# Patient Record
Sex: FEMALE | Race: WHITE | NOT HISPANIC OR LATINO
[De-identification: names, ages, dates, MRNs, and addresses within clinical notes are randomized per-mention and may not be internally consistent; named-entity substitution may affect disease eponyms.]

---

## 2017-02-07 ENCOUNTER — APPOINTMENT (OUTPATIENT)
Dept: ORTHOPEDIC SURGERY | Facility: CLINIC | Age: 62
End: 2017-02-07

## 2017-02-07 VITALS
BODY MASS INDEX: 28.35 KG/M2 | WEIGHT: 160 LBS | DIASTOLIC BLOOD PRESSURE: 80 MMHG | HEIGHT: 63 IN | SYSTOLIC BLOOD PRESSURE: 120 MMHG

## 2017-02-07 RX ORDER — MULTIVITAMIN
TABLET ORAL
Refills: 0 | Status: ACTIVE | COMMUNITY

## 2017-05-25 DIAGNOSIS — Z78.9 OTHER SPECIFIED HEALTH STATUS: ICD-10-CM

## 2017-06-26 ENCOUNTER — APPOINTMENT (OUTPATIENT)
Dept: ORTHOPEDIC SURGERY | Facility: CLINIC | Age: 62
End: 2017-06-26

## 2017-07-05 ENCOUNTER — APPOINTMENT (OUTPATIENT)
Dept: ORTHOPEDIC SURGERY | Facility: CLINIC | Age: 62
End: 2017-07-05

## 2017-07-10 ENCOUNTER — APPOINTMENT (OUTPATIENT)
Dept: ORTHOPEDIC SURGERY | Facility: CLINIC | Age: 62
End: 2017-07-10

## 2018-01-16 ENCOUNTER — APPOINTMENT (OUTPATIENT)
Dept: ORTHOPEDIC SURGERY | Facility: CLINIC | Age: 63
End: 2018-01-16
Payer: COMMERCIAL

## 2018-01-16 PROCEDURE — 20610 DRAIN/INJ JOINT/BURSA W/O US: CPT | Mod: RT

## 2018-01-23 ENCOUNTER — APPOINTMENT (OUTPATIENT)
Dept: ORTHOPEDIC SURGERY | Facility: CLINIC | Age: 63
End: 2018-01-23
Payer: COMMERCIAL

## 2018-01-23 PROCEDURE — 20610 DRAIN/INJ JOINT/BURSA W/O US: CPT | Mod: LT

## 2018-01-30 ENCOUNTER — APPOINTMENT (OUTPATIENT)
Dept: ORTHOPEDIC SURGERY | Facility: CLINIC | Age: 63
End: 2018-01-30
Payer: COMMERCIAL

## 2018-01-30 PROCEDURE — 20610 DRAIN/INJ JOINT/BURSA W/O US: CPT | Mod: LT

## 2018-08-07 ENCOUNTER — APPOINTMENT (OUTPATIENT)
Dept: ORTHOPEDIC SURGERY | Facility: CLINIC | Age: 63
End: 2018-08-07
Payer: COMMERCIAL

## 2018-08-07 PROCEDURE — 20611 DRAIN/INJ JOINT/BURSA W/US: CPT | Mod: LT

## 2018-08-14 ENCOUNTER — APPOINTMENT (OUTPATIENT)
Dept: ORTHOPEDIC SURGERY | Facility: CLINIC | Age: 63
End: 2018-08-14
Payer: COMMERCIAL

## 2018-08-14 PROCEDURE — 20610 DRAIN/INJ JOINT/BURSA W/O US: CPT | Mod: LT

## 2018-08-21 ENCOUNTER — APPOINTMENT (OUTPATIENT)
Dept: ORTHOPEDIC SURGERY | Facility: CLINIC | Age: 63
End: 2018-08-21
Payer: COMMERCIAL

## 2018-08-21 PROCEDURE — 20610 DRAIN/INJ JOINT/BURSA W/O US: CPT | Mod: LT

## 2018-11-11 ENCOUNTER — FORM ENCOUNTER (OUTPATIENT)
Age: 63
End: 2018-11-11

## 2018-11-12 ENCOUNTER — APPOINTMENT (OUTPATIENT)
Dept: PHYSICAL MEDICINE AND REHAB | Facility: CLINIC | Age: 63
End: 2018-11-12
Payer: COMMERCIAL

## 2018-11-12 ENCOUNTER — APPOINTMENT (OUTPATIENT)
Dept: RADIOLOGY | Facility: CLINIC | Age: 63
End: 2018-11-12

## 2018-11-12 ENCOUNTER — OUTPATIENT (OUTPATIENT)
Dept: OUTPATIENT SERVICES | Facility: HOSPITAL | Age: 63
LOS: 1 days | End: 2018-11-12
Payer: COMMERCIAL

## 2018-11-12 VITALS
DIASTOLIC BLOOD PRESSURE: 94 MMHG | BODY MASS INDEX: 30.14 KG/M2 | HEART RATE: 97 BPM | SYSTOLIC BLOOD PRESSURE: 180 MMHG | RESPIRATION RATE: 16 BRPM | WEIGHT: 168 LBS | HEIGHT: 62.5 IN

## 2018-11-12 PROCEDURE — 73562 X-RAY EXAM OF KNEE 3: CPT | Mod: 26

## 2018-11-12 PROCEDURE — 73562 X-RAY EXAM OF KNEE 3: CPT

## 2018-11-12 PROCEDURE — 99244 OFF/OP CNSLTJ NEW/EST MOD 40: CPT

## 2019-04-25 ENCOUNTER — APPOINTMENT (OUTPATIENT)
Age: 64
End: 2019-04-25
Payer: COMMERCIAL

## 2019-04-25 VITALS
RESPIRATION RATE: 16 BRPM | HEIGHT: 62.5 IN | BODY MASS INDEX: 30.14 KG/M2 | OXYGEN SATURATION: 97 % | WEIGHT: 168 LBS | HEART RATE: 68 BPM

## 2019-04-25 PROCEDURE — 20610 DRAIN/INJ JOINT/BURSA W/O US: CPT | Mod: 50

## 2019-04-25 PROCEDURE — 99214 OFFICE O/P EST MOD 30 MIN: CPT | Mod: 25

## 2019-04-25 NOTE — PHYSICAL EXAM
[FreeTextEntry1] : GEN:  NAD, AAOx3.\par HEENT:  NCAT. EOMI. Anicteric sclerae, no discharge.\par PSYCH:  Normal mood and affect. Responds appropriately to commands.\par CV:  DP and PT pulses 2+ bilaterally, no edema.\par INSPECTION:  No effusion, discoloration. Normal patellar tracking. \par GAIT: Non-antalgic.\par Lumbosacral AROM: within normal limits.\par Hip and ankle PROM: Full and pain free.\par Knee AROM: Full and pain free.\par PALPATION:  No effusion, warmth or crepitus. No tenderness noted at patellar facets, medial or lateral joint line, popliteal fossa, patellar tendon, quad tendon, hamstrings, ITB.  \par LYMPH:  No popliteal LAD or lymphedema.\par REFLEXES:  2+ symmetric bilateral knee and ankles.\par SENSATION:  Normal to light touch in the bilateral lower extremities.\par MOTOR:  5/5 in all key muscle groups of the bilateral lower limbs. No spasticity, tremor, atrophy or contractures noted.\par SPECIAL:  Apley Grind negative bilaterally, Robby negative bilaterally, Medial/Lateral compression negative bilaterally, Varus/Valgus stress negative bilaterally, Single Leg Squat negative bilaterally, Anterior/Posterior drawer negative bilaterally, Lachman negative bilaterally.

## 2019-04-25 NOTE — ASSESSMENT
[FreeTextEntry1] : Impression:\par 1. Bilateral Knee OA\par \par Plan: Review of the history, physical examination, and imaging, the patient's symptoms remain consistent with DJD of bilateral knees, worse on the right. The imaging results and diagnosis were discussed in detail with the patient. We discussed all the potential treatment options including physical therapy, oral medication, ultrasound guided injections, and surgery; as well as alternative therapeutics such as acupuncture and massage. We also discussed the importance of weight loss, ergonomics, and posture in the lost term management of the condition. At this time the patient is more than 6 months from her previous Euflexxa injections and would like to initiate the next round. Please see procedure note for full detail. The patient expressed verbal understanding and is in agreement with the plan of care. All of the patient's questions and concerns were addressed during today's visit.

## 2019-04-25 NOTE — PROCEDURE
[de-identified] : Procedure: Intra-articular BILATERAL  Knee Viscosupplementation Injection (EUFLEXXA) #1\par \par Findings: The RIGHT & LEFT knee XRays reveal tricompartmental degenerative joint disease. \par \par Injectate: 2 mL EUFLEXXA (1% Sodium Hyaluronate)\par \par After risks, benefits and alternatives were discussed and the patient expressed understanding, informed consent was obtained. Risks include but are not limited to bleeding, infection, worsening pain, nerve damage, scar formation. \par \par The RIGHT anterior inferomedial area was marked and prepped using Chloraprep.  Using a 25G 1.5 inch needle, 2mL of 1% Lidocaine was used to anesthetize the superficial tissue.  Then, using an inferomedial approach, a 22-gauge 1.5” needle was introduced into the knee joint and after negative aspiration for blood or synovial fluid, the above injectate was administered needle into the joint space without any resistance. The above procedure was then repeated for the LEFT knee. \par \par At the conclusion of the procedure the needles were withdrawn.  Direct pressure was applied to the area.  A Band-Aid was used to cover the injection site.  There were no complications during or after the procedure.  The patient tolerated the procedure well.  Post procedure care instructions and follow up appointment were given. If there are any complications, the patient was instructed to call the office.  \par \par

## 2019-04-25 NOTE — HISTORY OF PRESENT ILLNESS
[FreeTextEntry1] : Ms. JACKSON LIND is a very pleasant 63 year female who comes in for follow up evaluation of bilateral knee pain. The patient has been undergoing viscosupplementation every six months with consistent symptom relief and is here to initiate the next round of injections. At present the symptoms are relatively unchanged. The pain is located primarily in the anterior aspect of the right knee and is intermittent in nature and described as aching. The pain is rated as 5/10 during today's visit, and ranges from 2-7/10. The patient's symptoms are aggravated by walking and carrying heavy items and alleviated by massage, HA injections, elevation and exercise . The patient denies any night pain, numbness/tingling, weakness, or bowel/bladder dysfunction. The patient has no other complaints at this time.

## 2019-04-29 ENCOUNTER — APPOINTMENT (OUTPATIENT)
Age: 64
End: 2019-04-29
Payer: COMMERCIAL

## 2019-04-29 VITALS — HEIGHT: 62.5 IN | RESPIRATION RATE: 16 BRPM | BODY MASS INDEX: 30.14 KG/M2 | WEIGHT: 168 LBS

## 2019-04-29 PROCEDURE — 20610 DRAIN/INJ JOINT/BURSA W/O US: CPT | Mod: 50

## 2019-05-06 ENCOUNTER — APPOINTMENT (OUTPATIENT)
Age: 64
End: 2019-05-06
Payer: COMMERCIAL

## 2019-05-06 VITALS
BODY MASS INDEX: 30.14 KG/M2 | RESPIRATION RATE: 16 BRPM | OXYGEN SATURATION: 97 % | HEIGHT: 62.5 IN | WEIGHT: 168 LBS | HEART RATE: 71 BPM

## 2019-05-06 PROCEDURE — 20610 DRAIN/INJ JOINT/BURSA W/O US: CPT | Mod: 50

## 2019-09-27 ENCOUNTER — APPOINTMENT (OUTPATIENT)
Dept: PHYSICAL MEDICINE AND REHAB | Facility: CLINIC | Age: 64
End: 2019-09-27
Payer: COMMERCIAL

## 2019-09-27 VITALS
HEART RATE: 80 BPM | HEIGHT: 62.5 IN | BODY MASS INDEX: 30.14 KG/M2 | WEIGHT: 168 LBS | OXYGEN SATURATION: 98 % | RESPIRATION RATE: 16 BRPM

## 2019-09-27 PROCEDURE — 99214 OFFICE O/P EST MOD 30 MIN: CPT

## 2019-11-11 ENCOUNTER — APPOINTMENT (OUTPATIENT)
Dept: PHYSICAL MEDICINE AND REHAB | Facility: CLINIC | Age: 64
End: 2019-11-11
Payer: COMMERCIAL

## 2019-11-11 VITALS
WEIGHT: 168 LBS | BODY MASS INDEX: 30.14 KG/M2 | OXYGEN SATURATION: 97 % | RESPIRATION RATE: 16 BRPM | HEART RATE: 70 BPM | HEIGHT: 62.5 IN

## 2019-11-11 PROCEDURE — 20610 DRAIN/INJ JOINT/BURSA W/O US: CPT | Mod: 50

## 2019-11-18 ENCOUNTER — APPOINTMENT (OUTPATIENT)
Dept: PHYSICAL MEDICINE AND REHAB | Facility: CLINIC | Age: 64
End: 2019-11-18
Payer: COMMERCIAL

## 2019-11-18 VITALS
BODY MASS INDEX: 30.14 KG/M2 | WEIGHT: 168 LBS | HEART RATE: 78 BPM | HEIGHT: 62.5 IN | OXYGEN SATURATION: 100 % | RESPIRATION RATE: 16 BRPM

## 2019-11-18 PROCEDURE — 20610 DRAIN/INJ JOINT/BURSA W/O US: CPT | Mod: 50

## 2019-11-25 ENCOUNTER — APPOINTMENT (OUTPATIENT)
Dept: PHYSICAL MEDICINE AND REHAB | Facility: CLINIC | Age: 64
End: 2019-11-25
Payer: COMMERCIAL

## 2019-11-25 VITALS
HEART RATE: 65 BPM | HEIGHT: 62.5 IN | WEIGHT: 168 LBS | RESPIRATION RATE: 16 BRPM | BODY MASS INDEX: 30.14 KG/M2 | OXYGEN SATURATION: 98 %

## 2019-11-25 PROCEDURE — 20610 DRAIN/INJ JOINT/BURSA W/O US: CPT | Mod: 50

## 2020-06-09 ENCOUNTER — APPOINTMENT (OUTPATIENT)
Dept: PHYSICAL MEDICINE AND REHAB | Facility: CLINIC | Age: 65
End: 2020-06-09
Payer: COMMERCIAL

## 2020-06-09 VITALS — RESPIRATION RATE: 16 BRPM | BODY MASS INDEX: 30.14 KG/M2 | WEIGHT: 168 LBS | HEIGHT: 62.5 IN | OXYGEN SATURATION: 98 %

## 2020-06-09 PROCEDURE — 99214 OFFICE O/P EST MOD 30 MIN: CPT

## 2020-08-05 ENCOUNTER — APPOINTMENT (OUTPATIENT)
Dept: PHYSICAL MEDICINE AND REHAB | Facility: CLINIC | Age: 65
End: 2020-08-05
Payer: COMMERCIAL

## 2020-08-05 VITALS — RESPIRATION RATE: 16 BRPM | WEIGHT: 168 LBS | BODY MASS INDEX: 30.14 KG/M2 | HEIGHT: 62.5 IN

## 2020-08-05 PROCEDURE — 20610 DRAIN/INJ JOINT/BURSA W/O US: CPT | Mod: 50

## 2020-08-11 ENCOUNTER — APPOINTMENT (OUTPATIENT)
Dept: PHYSICAL MEDICINE AND REHAB | Facility: CLINIC | Age: 65
End: 2020-08-11
Payer: COMMERCIAL

## 2020-08-11 VITALS — RESPIRATION RATE: 16 BRPM | BODY MASS INDEX: 30.14 KG/M2 | HEIGHT: 62.5 IN | WEIGHT: 168 LBS

## 2020-08-11 PROCEDURE — 20610 DRAIN/INJ JOINT/BURSA W/O US: CPT | Mod: 50

## 2020-08-17 ENCOUNTER — APPOINTMENT (OUTPATIENT)
Dept: PHYSICAL MEDICINE AND REHAB | Facility: CLINIC | Age: 65
End: 2020-08-17
Payer: COMMERCIAL

## 2020-08-17 PROCEDURE — 20610 DRAIN/INJ JOINT/BURSA W/O US: CPT | Mod: 50

## 2021-02-09 ENCOUNTER — APPOINTMENT (OUTPATIENT)
Dept: PHYSICAL MEDICINE AND REHAB | Facility: CLINIC | Age: 66
End: 2021-02-09
Payer: COMMERCIAL

## 2021-02-09 PROCEDURE — 99442: CPT

## 2021-02-09 RX ORDER — HYALURONATE SODIUM 20 MG/2 ML
20 SYRINGE (ML) INTRAARTICULAR
Qty: 2 | Refills: 0 | Status: ACTIVE | COMMUNITY
Start: 2021-02-09

## 2021-03-08 ENCOUNTER — APPOINTMENT (OUTPATIENT)
Dept: PHYSICAL MEDICINE AND REHAB | Facility: CLINIC | Age: 66
End: 2021-03-08
Payer: COMMERCIAL

## 2021-03-08 VITALS — HEIGHT: 62.5 IN | BODY MASS INDEX: 30.14 KG/M2 | WEIGHT: 168 LBS | RESPIRATION RATE: 16 BRPM | OXYGEN SATURATION: 98 %

## 2021-03-08 PROCEDURE — 99072 ADDL SUPL MATRL&STAF TM PHE: CPT

## 2021-03-08 PROCEDURE — 20610 DRAIN/INJ JOINT/BURSA W/O US: CPT | Mod: 50

## 2021-03-08 PROCEDURE — 99213 OFFICE O/P EST LOW 20 MIN: CPT | Mod: 25

## 2021-03-08 NOTE — HISTORY OF PRESENT ILLNESS
[FreeTextEntry1] : Ms. JACKSON LIND is a very pleasant 63 year female who comes in for follow up evaluation of bilateral knee pain. The patient has had excellent relief with Orthovisc and is ready to begin the next series. The pain remains located primarily in the anterior aspect of the knees and is intermittent in nature and described as aching. The pain is rated as 5/10 during today's visit, and ranges from 2-7/10. The patient's symptoms are aggravated by walking and carrying heavy items and alleviated by massage, HA injections, elevation and exercise. The patient denies any night pain, numbness/tingling, weakness, or bowel/bladder dysfunction. The patient has no other complaints at this time.

## 2021-03-08 NOTE — ASSESSMENT
[FreeTextEntry1] : Impression:\par 1. Bilateral Knee OA\par \par Plan: The history, physical examination, and imaging were reviewed. Symptoms remain consistent with DJD of bilateral knees, worse on the right. The patient has had excellent response to viscosupplementation and is here to begin the first in the next series of Orthovisc injections. Please see procedure note for full detail. The patient expressed verbal understanding and is in agreement with the plan of care. All of the patient's questions and concerns were addressed during today's visit.

## 2021-03-08 NOTE — PROCEDURE
[de-identified] : Procedure: Intra-articular BILATERAL Knee Viscosupplementation Injection (ORTHOVISC) #1\par \par Findings: The RIGHT & LEFT knee XRays reveal tricompartmental degenerative joint disease. \par \par Injectate: 2 mL ORTHOVISC (1% Sodium Hyaluronate)\par \par After risks, benefits and alternatives were discussed and the patient expressed understanding, informed consent was obtained. Risks include but are not limited to bleeding, infection, worsening pain, nerve damage, scar formation. \par \par The RIGHT anterior inferomedial area was marked and prepped using Chloraprep. Using a 25G 1.5 inch needle, 2mL of 1% Lidocaine was used to anesthetize the superficial tissue. Then, using an inferomedial approach, a 22-gauge 1.5” needle was introduced into the knee joint and after negative aspiration for blood or synovial fluid, the above injectate was administered needle into the joint space without any resistance. The above procedure was then repeated for the LEFT knee. \par \par At the conclusion of the procedure the needles were withdrawn. Direct pressure was applied to the area. A Band-Aid was used to cover the injection site. There were no complications during or after the procedure. The patient tolerated the procedure well. Post procedure care instructions and follow up appointment were given. If there are any complications, the patient was instructed to call the office. \par  \par  \par

## 2021-03-15 ENCOUNTER — APPOINTMENT (OUTPATIENT)
Dept: PHYSICAL MEDICINE AND REHAB | Facility: CLINIC | Age: 66
End: 2021-03-15
Payer: COMMERCIAL

## 2021-03-15 VITALS — OXYGEN SATURATION: 98 % | HEIGHT: 62.5 IN | WEIGHT: 168 LBS | BODY MASS INDEX: 30.14 KG/M2 | RESPIRATION RATE: 16 BRPM

## 2021-03-15 PROCEDURE — 20610 DRAIN/INJ JOINT/BURSA W/O US: CPT | Mod: 50

## 2021-03-15 PROCEDURE — 99072 ADDL SUPL MATRL&STAF TM PHE: CPT

## 2021-03-22 ENCOUNTER — APPOINTMENT (OUTPATIENT)
Dept: PHYSICAL MEDICINE AND REHAB | Facility: CLINIC | Age: 66
End: 2021-03-22
Payer: COMMERCIAL

## 2021-03-22 VITALS — WEIGHT: 168 LBS | OXYGEN SATURATION: 98 % | RESPIRATION RATE: 16 BRPM | BODY MASS INDEX: 30.14 KG/M2 | HEIGHT: 62.5 IN

## 2021-03-22 PROCEDURE — 20610 DRAIN/INJ JOINT/BURSA W/O US: CPT | Mod: 50

## 2021-03-22 PROCEDURE — 99072 ADDL SUPL MATRL&STAF TM PHE: CPT

## 2021-10-11 ENCOUNTER — APPOINTMENT (OUTPATIENT)
Dept: PHYSICAL MEDICINE AND REHAB | Facility: CLINIC | Age: 66
End: 2021-10-11
Payer: COMMERCIAL

## 2021-10-11 VITALS — WEIGHT: 160 LBS | BODY MASS INDEX: 28.35 KG/M2 | HEIGHT: 63 IN

## 2021-10-11 PROCEDURE — 99213 OFFICE O/P EST LOW 20 MIN: CPT

## 2021-10-11 NOTE — PHYSICAL EXAM
[FreeTextEntry1] : GEN:  NAD, AAOx3.\par INSPECTION:  No effusion, discoloration. Normal patellar tracking. \par GAIT: Non-antalgic.\par Knee AROM: Full and pain free.\par PALPATION:  No effusion, warmth or crepitus. No tenderness noted at patellar facets, medial or lateral joint line, popliteal fossa, patellar tendon, quad tendon, hamstrings, ITB.  \par MOTOR:  5/5 in all key muscle groups of the bilateral lower limbs. No spasticity, tremor, atrophy or contractures noted.\par SPECIAL:  Apley Grind negative bilaterally, Robby negative bilaterally, Medial/Lateral compression negative bilaterally, Varus/Valgus stress negative bilaterally, Single Leg Squat negative bilaterally, Anterior/Posterior drawer negative bilaterally, Lachman negative bilaterally.

## 2021-10-11 NOTE — ASSESSMENT
[FreeTextEntry1] : Impression:\par 1. Bilateral Knee OA\par \par Plan: Review of the history, physical examination, and imaging, the patient's symptoms remain consistent with DJD of bilateral knees, worse on the right. The imaging results and diagnosis were reviewed with the patient. We discussed all the potential treatment options including physical therapy, oral medication, ultrasound guided injections, and surgery; as well as alternative therapeutics such as acupuncture and massage. We also discussed the importance of weight loss, ergonomics, and posture in the lost term management of the condition. At this time the patient is 6 months from her previous Orthovisc injections and would like to initiate the next round. We will submit for approval and begin the series. The patient expressed verbal understanding and is in agreement with the plan of care. All of the patient's questions and concerns were addressed during today's visit.

## 2021-10-25 ENCOUNTER — APPOINTMENT (OUTPATIENT)
Dept: PHYSICAL MEDICINE AND REHAB | Facility: CLINIC | Age: 66
End: 2021-10-25
Payer: COMMERCIAL

## 2021-10-25 VITALS — BODY MASS INDEX: 28.35 KG/M2 | RESPIRATION RATE: 16 BRPM | HEIGHT: 63 IN | WEIGHT: 160 LBS

## 2021-10-25 PROCEDURE — 20610 DRAIN/INJ JOINT/BURSA W/O US: CPT | Mod: 50

## 2021-11-01 ENCOUNTER — APPOINTMENT (OUTPATIENT)
Dept: PHYSICAL MEDICINE AND REHAB | Facility: CLINIC | Age: 66
End: 2021-11-01
Payer: COMMERCIAL

## 2021-11-01 VITALS — RESPIRATION RATE: 16 BRPM | HEIGHT: 63 IN | BODY MASS INDEX: 28.35 KG/M2 | WEIGHT: 160 LBS

## 2021-11-01 PROCEDURE — 20610 DRAIN/INJ JOINT/BURSA W/O US: CPT | Mod: 50

## 2021-11-08 ENCOUNTER — APPOINTMENT (OUTPATIENT)
Dept: PHYSICAL MEDICINE AND REHAB | Facility: CLINIC | Age: 66
End: 2021-11-08
Payer: COMMERCIAL

## 2021-11-08 VITALS — WEIGHT: 160 LBS | BODY MASS INDEX: 28.35 KG/M2 | RESPIRATION RATE: 16 BRPM | HEIGHT: 63 IN

## 2021-11-08 PROCEDURE — 20610 DRAIN/INJ JOINT/BURSA W/O US: CPT | Mod: 50

## 2022-05-11 ENCOUNTER — APPOINTMENT (OUTPATIENT)
Dept: PHYSICAL MEDICINE AND REHAB | Facility: CLINIC | Age: 67
End: 2022-05-11
Payer: COMMERCIAL

## 2022-05-11 VITALS — WEIGHT: 160 LBS | BODY MASS INDEX: 28.35 KG/M2 | HEIGHT: 63 IN

## 2022-05-11 PROCEDURE — 99213 OFFICE O/P EST LOW 20 MIN: CPT

## 2022-05-11 RX ORDER — HYALURONATE SODIUM 30 MG/2 ML
30 SYRINGE (ML) INTRAARTICULAR
Qty: 6 | Refills: 0 | Status: ACTIVE | COMMUNITY
Start: 2022-05-11

## 2022-05-11 NOTE — HISTORY OF PRESENT ILLNESS
[FreeTextEntry1] : Ms. JACKSON LIND is a very pleasant 63 year female who comes in for follow up evaluation of bilateral knee pain. The patient has had excellent relief over the past several months with Orthovisc. The symptoms have recently started to return and she is interested in repeat injections. The pain remains located primarily in the anterior aspect of the knees and is intermittent in nature and described as aching. The pain is rated as 5/10 during today's visit, and ranges from 2-7/10. The patient's symptoms are aggravated by walking and carrying heavy items and alleviated by massage, HA injections, elevation and exercise. The patient denies any night pain, numbness/tingling, weakness, or bowel/bladder dysfunction. The patient has no other complaints at this time.

## 2022-05-31 ENCOUNTER — APPOINTMENT (OUTPATIENT)
Dept: PHYSICAL MEDICINE AND REHAB | Facility: CLINIC | Age: 67
End: 2022-05-31
Payer: COMMERCIAL

## 2022-05-31 VITALS — BODY MASS INDEX: 28.35 KG/M2 | HEIGHT: 63 IN | WEIGHT: 160 LBS

## 2022-05-31 PROCEDURE — 20610 DRAIN/INJ JOINT/BURSA W/O US: CPT | Mod: RT

## 2022-06-06 ENCOUNTER — APPOINTMENT (OUTPATIENT)
Dept: PHYSICAL MEDICINE AND REHAB | Facility: CLINIC | Age: 67
End: 2022-06-06
Payer: COMMERCIAL

## 2022-06-06 PROCEDURE — 20610 DRAIN/INJ JOINT/BURSA W/O US: CPT | Mod: RT

## 2022-06-13 ENCOUNTER — APPOINTMENT (OUTPATIENT)
Dept: PHYSICAL MEDICINE AND REHAB | Facility: CLINIC | Age: 67
End: 2022-06-13
Payer: COMMERCIAL

## 2022-06-13 VITALS — WEIGHT: 160 LBS | BODY MASS INDEX: 28.35 KG/M2 | HEIGHT: 63 IN

## 2022-06-13 PROCEDURE — 20610 DRAIN/INJ JOINT/BURSA W/O US: CPT | Mod: RT

## 2022-12-07 ENCOUNTER — APPOINTMENT (OUTPATIENT)
Dept: PHYSICAL MEDICINE AND REHAB | Facility: CLINIC | Age: 67
End: 2022-12-07

## 2022-12-07 VITALS — BODY MASS INDEX: 28.35 KG/M2 | WEIGHT: 160 LBS | HEIGHT: 63 IN

## 2022-12-07 PROCEDURE — 99214 OFFICE O/P EST MOD 30 MIN: CPT

## 2022-12-07 RX ORDER — HYALURONATE SODIUM 20 MG/2 ML
20 SYRINGE (ML) INTRAARTICULAR
Qty: 2 | Refills: 0 | Status: ACTIVE | COMMUNITY
Start: 2022-12-07

## 2022-12-07 NOTE — ASSESSMENT
[FreeTextEntry1] : Impression:\par 1. Bilateral Knee OA\par \par Plan: Review of the history, physical examination, and imaging, the patient's symptoms remain consistent with DJD of bilateral knees, worse on the right. The imaging results and diagnosis were reviewed with the patient. We discussed all the potential treatment options including physical therapy, oral medication, ultrasound guided injections, and surgery; as well as alternative therapeutics such as acupuncture and massage. We also discussed the importance of weight loss, ergonomics, and posture in the lost term management of the condition. At this time the patient is 6 months from her previous Viscosupplementation injections and would like to initiate the next round. We will submit for approval and begin the series. The patient expressed verbal understanding and is in agreement with the plan of care. All of the patient's questions and concerns were addressed during today's visit.

## 2022-12-12 ENCOUNTER — APPOINTMENT (OUTPATIENT)
Dept: PHYSICAL MEDICINE AND REHAB | Facility: CLINIC | Age: 67
End: 2022-12-12

## 2022-12-12 VITALS — BODY MASS INDEX: 28.35 KG/M2 | WEIGHT: 160 LBS | HEIGHT: 63 IN

## 2022-12-12 PROCEDURE — 20610 DRAIN/INJ JOINT/BURSA W/O US: CPT | Mod: 50

## 2022-12-12 NOTE — PROCEDURE
[de-identified] : LOT#: [id number]\par EXP DATE: 2023-10-28\par \par Procedure: Intra-articular BILATERAL Knee Viscosupplementation Injection (EUFLEXXA) #1\par \par Findings: The RIGHT and LEFT knee XRays reveal tricompartmental degenerative joint disease. \par \par Injectate: 2 mL EUFLEXXA (1% Sodium Hyaluronate)\par \par After risks, benefits and alternatives were discussed and the patient expressed understanding, informed consent was obtained. Risks include but are not limited to bleeding, infection, worsening pain, nerve damage, scar formation. \par \par The RIGHT anterior inferomedial area was landmarked using a depression made by the tip of the needle cap.  The area was prepped with Betadine.  Using sterile technique, a solution of 1% Lidocaine in a 3.0 ml syringe and a 25-gauge needle was used to anesthetize the superficial tissue.  Using an inferomedial approach, a 22-gauge 1.5” needle was introduced into the knee joint and the above injectate was administered through the 22-gauge needle into the joint space without any resistance. The above procedure was then repeated for the LEFT knee.\par \par At the conclusion of the procedure the needles were withdrawn.  Direct pressure was applied to the area.  A Band-Aid was used to cover the injection site.  There were no complications during or after the procedure.  The patient tolerated the procedure well and reported improvement in symptoms following administration of the injectate.  The patient was instructed to ice the area as needed. If there are any complications, the patient was instructed to call us.  \par

## 2022-12-19 ENCOUNTER — APPOINTMENT (OUTPATIENT)
Dept: PHYSICAL MEDICINE AND REHAB | Facility: CLINIC | Age: 67
End: 2022-12-19

## 2022-12-19 VITALS — WEIGHT: 160 LBS | BODY MASS INDEX: 28.35 KG/M2 | HEIGHT: 63 IN

## 2022-12-19 PROCEDURE — 20610 DRAIN/INJ JOINT/BURSA W/O US: CPT | Mod: 50

## 2022-12-19 NOTE — PROCEDURE
[de-identified] : LOT#: [id number]\par EXP DATE: 2023-10-28\par \par Procedure: Intra-articular BILATERAL Knee Viscosupplementation Injection (EUFLEXXA) #2\par \par Findings: The RIGHT and LEFT knee XRays reveal tricompartmental degenerative joint disease. \par \par Injectate: 2 mL EUFLEXXA (1% Sodium Hyaluronate)\par \par After risks, benefits and alternatives were discussed and the patient expressed understanding, informed consent was obtained. Risks include but are not limited to bleeding, infection, worsening pain, nerve damage, scar formation. \par \par The RIGHT anterior inferomedial area was landmarked using a depression made by the tip of the needle cap.  The area was prepped with Betadine.  Using sterile technique, a solution of 1% Lidocaine in a 3.0 ml syringe and a 25-gauge needle was used to anesthetize the superficial tissue.  Using an inferomedial approach, a 22-gauge 1.5” needle was introduced into the knee joint and the above injectate was administered through the 22-gauge needle into the joint space without any resistance. The above procedure was then repeated for the LEFT knee.\par \par At the conclusion of the procedure the needles were withdrawn.  Direct pressure was applied to the area.  A Band-Aid was used to cover the injection site.  There were no complications during or after the procedure.  The patient tolerated the procedure well and reported improvement in symptoms following administration of the injectate.  The patient was instructed to ice the area as needed. If there are any complications, the patient was instructed to call us.  \par

## 2022-12-27 ENCOUNTER — APPOINTMENT (OUTPATIENT)
Dept: PHYSICAL MEDICINE AND REHAB | Facility: CLINIC | Age: 67
End: 2022-12-27

## 2022-12-27 VITALS — HEIGHT: 63 IN | WEIGHT: 160 LBS | BODY MASS INDEX: 28.35 KG/M2

## 2022-12-27 PROCEDURE — 20610 DRAIN/INJ JOINT/BURSA W/O US: CPT | Mod: 50

## 2022-12-27 NOTE — PROCEDURE
[de-identified] : LOT#: [id number]\par EXP DATE: 2023-10-28\par \par Procedure: Intra-articular BILATERAL Knee Viscosupplementation Injection (EUFLEXXA) #3\par \par Findings: The RIGHT and LEFT knee XRays reveal tricompartmental degenerative joint disease. \par \par Injectate: 2 mL EUFLEXXA (1% Sodium Hyaluronate)\par \par After risks, benefits and alternatives were discussed and the patient expressed understanding, informed consent was obtained. Risks include but are not limited to bleeding, infection, worsening pain, nerve damage, scar formation. \par \par The RIGHT anterior inferomedial area was landmarked using a depression made by the tip of the needle cap.  The area was prepped with Betadine.  Using sterile technique, a solution of 1% Lidocaine in a 3.0 ml syringe and a 25-gauge needle was used to anesthetize the superficial tissue.  Using an inferomedial approach, a 22-gauge 1.5” needle was introduced into the knee joint and the above injectate was administered through the 22-gauge needle into the joint space without any resistance. The above procedure was then repeated for the LEFT knee.\par \par At the conclusion of the procedure the needles were withdrawn.  Direct pressure was applied to the area.  A Band-Aid was used to cover the injection site.  There were no complications during or after the procedure.  The patient tolerated the procedure well and reported improvement in symptoms following administration of the injectate.  The patient was instructed to ice the area as needed. If there are any complications, the patient was instructed to call us.  \par

## 2022-12-27 NOTE — PROCEDURE
[de-identified] : LOT#: [id number]\par EXP DATE: 2023-10-28\par \par Procedure: Intra-articular BILATERAL Knee Viscosupplementation Injection (EUFLEXXA) #3\par \par Findings: The RIGHT and LEFT knee XRays reveal tricompartmental degenerative joint disease. \par \par Injectate: 2 mL EUFLEXXA (1% Sodium Hyaluronate)\par \par After risks, benefits and alternatives were discussed and the patient expressed understanding, informed consent was obtained. Risks include but are not limited to bleeding, infection, worsening pain, nerve damage, scar formation. \par \par The RIGHT anterior inferomedial area was landmarked using a depression made by the tip of the needle cap.  The area was prepped with Betadine.  Using sterile technique, a solution of 1% Lidocaine in a 3.0 ml syringe and a 25-gauge needle was used to anesthetize the superficial tissue.  Using an inferomedial approach, a 22-gauge 1.5” needle was introduced into the knee joint and the above injectate was administered through the 22-gauge needle into the joint space without any resistance. The above procedure was then repeated for the LEFT knee.\par \par At the conclusion of the procedure the needles were withdrawn.  Direct pressure was applied to the area.  A Band-Aid was used to cover the injection site.  There were no complications during or after the procedure.  The patient tolerated the procedure well and reported improvement in symptoms following administration of the injectate.  The patient was instructed to ice the area as needed. If there are any complications, the patient was instructed to call us.  \par

## 2023-05-31 ENCOUNTER — APPOINTMENT (OUTPATIENT)
Dept: PHYSICAL MEDICINE AND REHAB | Facility: CLINIC | Age: 68
End: 2023-05-31
Payer: COMMERCIAL

## 2023-05-31 PROCEDURE — 73562 X-RAY EXAM OF KNEE 3: CPT | Mod: LT

## 2023-05-31 PROCEDURE — 99213 OFFICE O/P EST LOW 20 MIN: CPT

## 2023-05-31 RX ORDER — DICLOFENAC SODIUM 20 MG/G
2 SOLUTION TOPICAL
Qty: 1 | Refills: 1 | Status: DISCONTINUED | COMMUNITY
Start: 2018-11-12 | End: 2023-05-31

## 2023-05-31 RX ORDER — HYALURONATE SODIUM 20 MG/2 ML
20 SYRINGE (ML) INTRAARTICULAR
Qty: 2 | Refills: 0 | Status: DISCONTINUED | COMMUNITY
Start: 2019-02-04 | End: 2023-05-31

## 2023-05-31 RX ORDER — HYALURONATE SODIUM 30 MG/2 ML
30 SYRINGE (ML) INTRAARTICULAR
Qty: 6 | Refills: 0 | Status: ACTIVE | OUTPATIENT
Start: 2023-05-31

## 2023-05-31 RX ORDER — HYALURONATE SODIUM 20 MG/2 ML
20 SYRINGE (ML) INTRAARTICULAR
Qty: 2 | Refills: 0 | Status: DISCONTINUED | COMMUNITY
Start: 2020-06-09 | End: 2023-05-31

## 2023-05-31 RX ORDER — HYALURONATE SODIUM 30 MG/2 ML
30 SYRINGE (ML) INTRAARTICULAR
Qty: 6 | Refills: 0 | Status: DISCONTINUED | COMMUNITY
Start: 2021-10-11 | End: 2023-05-31

## 2023-05-31 RX ORDER — HYALURONATE SODIUM 20 MG/2 ML
20 SYRINGE (ML) INTRAARTICULAR
Qty: 2 | Refills: 0 | Status: DISCONTINUED | COMMUNITY
Start: 2019-09-27 | End: 2023-05-31

## 2023-05-31 NOTE — PHYSICAL EXAM
[FreeTextEntry1] : 67 year old female in NAD and overweight\par \par \par Gait - normal\par \par KNEE\par no swelling, erythema, warmth\par flexion to 120 deg, ext normal\par no TTP in patellar and quad tendon, medial/lateral joint\par 5/5 knee ext\par neg Lachman, Robby, valgus/varus laxity\par

## 2023-05-31 NOTE — ASSESSMENT
[FreeTextEntry1] : Discussed diagnosis and treatment plan including PT.\par Already did PT so does HEP and biking.\par Ice area often\par Taught hip abd.\par Can try glucosamine again up to 500 mg 3x/d\par

## 2023-05-31 NOTE — DATA REVIEWED
[FreeTextEntry1] : office xrays of bilateral knee ap, lat, sunrise show mod/severe left medial, moderate right medial and bilateral PTF arthritis.

## 2023-06-21 NOTE — HISTORY OF PRESENT ILLNESS
[FreeTextEntry1] : Location: knees\par Severity: 4/10\par Duration: years \par Context: \par Aggravating Factors: steps, \par Alleviating Factors: injections\par Associated Symptoms: denies weight loss, fever, chills, change in bowel/bladder habits, weakness, numbness/tingling\par Prior Studies: xray 2018\par 12/2022 bilateral Euflexxa injections Perilesional Excision Additional Text (Leave Blank If You Do Not Want): The margin was drawn around the clinically apparent lesion. Incisions were then made along these lines to the appropriate tissue plane and the lesion was extirpated.

## 2023-07-24 ENCOUNTER — NON-APPOINTMENT (OUTPATIENT)
Age: 68
End: 2023-07-24

## 2023-08-03 ENCOUNTER — APPOINTMENT (OUTPATIENT)
Dept: PHYSICAL MEDICINE AND REHAB | Facility: CLINIC | Age: 68
End: 2023-08-03
Payer: COMMERCIAL

## 2023-08-03 DIAGNOSIS — M17.10 UNILATERAL PRIMARY OSTEOARTHRITIS, UNSPECIFIED KNEE: ICD-10-CM

## 2023-08-03 PROCEDURE — 20611 DRAIN/INJ JOINT/BURSA W/US: CPT | Mod: 50

## 2023-08-03 NOTE — PROCEDURE
[de-identified] : Indication: pain  Ultrasound Guided Bilateral Knee Euflexxa Injection  After informed consent, she was placed in a seated position. Injection site was localized using anatomical landmarks. The skin was sterilely prepped. Using routine sterile technique and an anterolateral approach, Euflexxa was instilled using ultrasound guidance. The same procedure was repeated on the right side. There were no complications and a bandage was applied. She  tolerated the procedure well and was given post-injection instructions. Rec: Cold therapy, analgesics, avoid heavy activity.   Sho Lot [id number] Exp 9/1/24 NDC [address]-6

## 2023-08-10 ENCOUNTER — APPOINTMENT (OUTPATIENT)
Dept: PHYSICAL MEDICINE AND REHAB | Facility: CLINIC | Age: 68
End: 2023-08-10
Payer: COMMERCIAL

## 2023-08-10 PROCEDURE — 20611 DRAIN/INJ JOINT/BURSA W/US: CPT | Mod: 50

## 2023-08-10 NOTE — PROCEDURE
[de-identified] : Indication: pain  Ultrasound Guided Bilateral Knee Euflexxa Injection  After informed consent, she was placed in a seated position. Injection site was localized using anatomical landmarks. The skin was sterilely prepped. Using routine sterile technique and an anterolateral approach, Euflexxa was instilled using ultrasound guidance. The same procedure was repeated on the right side. There were no complications and a bandage was applied. She tolerated the procedure well and was given post-injection instructions. Rec: Cold therapy, analgesics, avoid heavy activity.   Sho Lot [id number] Exp 9/1/24 NDC [address]-0

## 2023-08-16 ENCOUNTER — APPOINTMENT (OUTPATIENT)
Dept: PHYSICAL MEDICINE AND REHAB | Facility: CLINIC | Age: 68
End: 2023-08-16
Payer: COMMERCIAL

## 2023-08-16 DIAGNOSIS — M17.11 UNILATERAL PRIMARY OSTEOARTHRITIS, RIGHT KNEE: ICD-10-CM

## 2023-08-16 DIAGNOSIS — M17.12 UNILATERAL PRIMARY OSTEOARTHRITIS, LEFT KNEE: ICD-10-CM

## 2023-08-16 PROCEDURE — 20611 DRAIN/INJ JOINT/BURSA W/US: CPT | Mod: 50

## 2023-08-16 NOTE — PROCEDURE
[de-identified] : Indication: pain  Ultrasound Guided Bilateral Knee Euflexxa Injection  After informed consent, she was placed in a seated position. Injection site was localized using anatomical landmarks. The skin was sterilely prepped. Using routine sterile technique and a superolateral approach, Euflexxa was instilled using ultrasound guidance. The same procedure was repeated on the right side. There were no complications and a bandage was applied. She tolerated the procedure well and was given post-injection instructions. Rec: Cold therapy, analgesics, avoid heavy activity.   Sho Lot [id number] Exp 9/1/24 NDC [address]-5